# Patient Record
Sex: MALE | Race: WHITE
[De-identification: names, ages, dates, MRNs, and addresses within clinical notes are randomized per-mention and may not be internally consistent; named-entity substitution may affect disease eponyms.]

---

## 2017-07-06 ENCOUNTER — HOSPITAL ENCOUNTER (EMERGENCY)
Dept: HOSPITAL 8 - ED | Age: 57
Discharge: HOME | End: 2017-07-06
Payer: MEDICAID

## 2017-07-06 VITALS — BODY MASS INDEX: 27.33 KG/M2 | WEIGHT: 231.49 LBS | HEIGHT: 77 IN

## 2017-07-06 VITALS — SYSTOLIC BLOOD PRESSURE: 125 MMHG | DIASTOLIC BLOOD PRESSURE: 68 MMHG

## 2017-07-06 DIAGNOSIS — H61.21: Primary | ICD-10-CM

## 2017-07-06 PROCEDURE — 99283 EMERGENCY DEPT VISIT LOW MDM: CPT

## 2017-07-06 PROCEDURE — 69210 REMOVE IMPACTED EAR WAX UNI: CPT

## 2019-03-08 ENCOUNTER — HOSPITAL ENCOUNTER (EMERGENCY)
Dept: HOSPITAL 8 - ED | Age: 59
Discharge: HOME | End: 2019-03-08
Payer: MEDICAID

## 2019-03-08 VITALS — WEIGHT: 237.44 LBS | HEIGHT: 77 IN | BODY MASS INDEX: 28.04 KG/M2

## 2019-03-08 VITALS — DIASTOLIC BLOOD PRESSURE: 81 MMHG | SYSTOLIC BLOOD PRESSURE: 116 MMHG

## 2019-03-08 DIAGNOSIS — M25.461: Primary | ICD-10-CM

## 2019-03-08 DIAGNOSIS — M71.21: ICD-10-CM

## 2019-03-08 LAB
ALBUMIN SERPL-MCNC: 3.5 G/DL (ref 3.4–5)
ANION GAP SERPL CALC-SCNC: 6 MMOL/L (ref 5–15)
BASOPHILS # BLD AUTO: 0.03 X10^3/UL (ref 0–0.1)
BASOPHILS NFR BLD AUTO: 0 % (ref 0–1)
CALCIUM SERPL-MCNC: 9.1 MG/DL (ref 8.5–10.1)
CHLORIDE SERPL-SCNC: 111 MMOL/L (ref 98–107)
CREAT SERPL-MCNC: 0.82 MG/DL (ref 0.7–1.3)
EOSINOPHIL # BLD AUTO: 0.35 X10^3/UL (ref 0–0.4)
EOSINOPHIL NFR BLD AUTO: 5 % (ref 1–7)
ERYTHROCYTE [DISTWIDTH] IN BLOOD BY AUTOMATED COUNT: 14.4 % (ref 9.4–14.8)
LYMPHOCYTES # BLD AUTO: 1.94 X10^3/UL (ref 1–3.4)
LYMPHOCYTES NFR BLD AUTO: 27 % (ref 22–44)
MCH RBC QN AUTO: 30.3 PG (ref 27.5–34.5)
MCHC RBC AUTO-ENTMCNC: 32.9 G/DL (ref 33.2–36.2)
MCV RBC AUTO: 92 FL (ref 81–97)
MD: NO
MONOCYTES # BLD AUTO: 0.62 X10^3/UL (ref 0.2–0.8)
MONOCYTES NFR BLD AUTO: 9 % (ref 2–9)
NEUTROPHILS # BLD AUTO: 4.31 X10^3/UL (ref 1.8–6.8)
NEUTROPHILS NFR BLD AUTO: 60 % (ref 42–75)
PLATELET # BLD AUTO: 244 X10^3/UL (ref 130–400)
PMV BLD AUTO: 8.1 FL (ref 7.4–10.4)
RBC # BLD AUTO: 5.82 X10^6/UL (ref 4.38–5.82)

## 2019-03-08 PROCEDURE — 82040 ASSAY OF SERUM ALBUMIN: CPT

## 2019-03-08 PROCEDURE — 36415 COLL VENOUS BLD VENIPUNCTURE: CPT

## 2019-03-08 PROCEDURE — 85025 COMPLETE CBC W/AUTO DIFF WBC: CPT

## 2019-03-08 PROCEDURE — 20610 DRAIN/INJ JOINT/BURSA W/O US: CPT

## 2019-03-08 PROCEDURE — 80048 BASIC METABOLIC PNL TOTAL CA: CPT

## 2019-03-08 PROCEDURE — 84550 ASSAY OF BLOOD/URIC ACID: CPT

## 2019-03-08 PROCEDURE — 99284 EMERGENCY DEPT VISIT MOD MDM: CPT

## 2019-07-22 ENCOUNTER — HOSPITAL ENCOUNTER (EMERGENCY)
Dept: HOSPITAL 8 - ED | Age: 59
Discharge: HOME | End: 2019-07-22
Payer: MEDICAID

## 2019-07-22 VITALS — WEIGHT: 224.87 LBS | HEIGHT: 77 IN | BODY MASS INDEX: 26.55 KG/M2

## 2019-07-22 VITALS — SYSTOLIC BLOOD PRESSURE: 118 MMHG | DIASTOLIC BLOOD PRESSURE: 78 MMHG

## 2019-07-22 DIAGNOSIS — Z96.651: ICD-10-CM

## 2019-07-22 DIAGNOSIS — M25.461: Primary | ICD-10-CM

## 2019-07-22 PROCEDURE — 99284 EMERGENCY DEPT VISIT MOD MDM: CPT

## 2019-07-22 PROCEDURE — 20610 DRAIN/INJ JOINT/BURSA W/O US: CPT

## 2019-07-22 NOTE — NUR
PATIENT ARRIVES ON CRUTCHES WITH RIGHT KNEE PAIN AND SWELLING. HE HAS HAD THIS 
IN PAST (FOUR MONTHS AGO) AND WENT TO [facility] WHERE THEY DRAINED IT, MRI'D IT AND 
IT WAS FINE FOR FOUR MONTHS.  THEN RECENTLY IT GOT INFLAMED AGAIN. IT LOOKS 
SWOLLEN ON PROXIMAL SIDE OF KNEE/PATELLA